# Patient Record
Sex: MALE | Race: BLACK OR AFRICAN AMERICAN | HISPANIC OR LATINO | Employment: FULL TIME | URBAN - METROPOLITAN AREA
[De-identification: names, ages, dates, MRNs, and addresses within clinical notes are randomized per-mention and may not be internally consistent; named-entity substitution may affect disease eponyms.]

---

## 2024-07-15 ENCOUNTER — OFFICE VISIT (OUTPATIENT)
Dept: INTERNAL MEDICINE | Age: 39
End: 2024-07-15
Payer: COMMERCIAL

## 2024-07-15 VITALS
HEART RATE: 100 BPM | WEIGHT: 135.2 LBS | OXYGEN SATURATION: 98 % | TEMPERATURE: 98.4 F | SYSTOLIC BLOOD PRESSURE: 136 MMHG | HEIGHT: 68 IN | DIASTOLIC BLOOD PRESSURE: 90 MMHG | BODY MASS INDEX: 20.49 KG/M2

## 2024-07-15 DIAGNOSIS — G89.29 CHRONIC MIDLINE LOW BACK PAIN WITHOUT SCIATICA: ICD-10-CM

## 2024-07-15 DIAGNOSIS — Z00.00 WELL ADULT EXAM: Primary | ICD-10-CM

## 2024-07-15 DIAGNOSIS — M54.50 CHRONIC MIDLINE LOW BACK PAIN WITHOUT SCIATICA: ICD-10-CM

## 2024-07-15 DIAGNOSIS — R03.0 ELEVATED BLOOD PRESSURE READING IN OFFICE WITH WHITE COAT SYNDROME, WITHOUT DIAGNOSIS OF HYPERTENSION: ICD-10-CM

## 2024-07-15 DIAGNOSIS — R14.0 ABDOMINAL BLOATING: ICD-10-CM

## 2024-07-15 PROCEDURE — 99386 PREV VISIT NEW AGE 40-64: CPT | Performed by: INTERNAL MEDICINE

## 2024-07-15 RX ORDER — MULTIPLE VITAMINS W/ MINERALS TAB 9MG-400MCG
1 TAB ORAL DAILY
COMMUNITY

## 2024-07-15 NOTE — ASSESSMENT & PLAN NOTE
Preventive measures: were reviewed with the patient at this office visit.  They included but were not limited to discussions in regards to vaccines outstanding, auto safety with seat belts and other assistive devices, fall prevention, and routine screening studies.    Exercise: No ischemic issues with routine activity presently, nor when running previously.  Comprehensive labs: All needed as of 7/24 OV.    Covid vaccine: Patient initial vaccines, none since then.  Other vaccines: Otherwise up-to-date.    PSA: Not yet indicated (negative FH)  Colon: Not yet indicated (negative FH)    SH: Single, no kids, non-smoker, industrial design.  FH: Father's side unknown, M mood d/o, S year older and healthy.

## 2024-07-15 NOTE — ASSESSMENT & PLAN NOTE
Initial episode was around age 34, no known trauma per se.  It persisted, he had a MRI which revealed a herniated disc at one of the lumbar regions.  It resolved with conservative treatment, he saw physical therapy etc.    As it is up recently during a move loading furniture etc. into a van.  Needed they are more days than not, but not requiring any over-the-counter medications just yet.

## 2024-07-15 NOTE — ASSESSMENT & PLAN NOTE
This is a chronic issue, past couple years as of his 7/24 OV.  Having issues keeping his weight up, it is typically in the 155 ballpark.  Oral intake is limited due to this bloating.  He does move his bowels regularly without any over-the-counter supplements.  We will get routine laboratory studies and place referral.

## 2024-07-15 NOTE — ASSESSMENT & PLAN NOTE
This was repeated and confirmed at his 7/24 OV.  Discussed with patient to monitor blood pressure as outpatient once a week of record these, and we will follow him up in about 3 months.

## 2024-07-15 NOTE — PROGRESS NOTES
"Chief Complaint  Getting Established (Pt moved her from [place]/He would like to talk about his back pain, he states that he has a herniated disc. /He would like to talk about a Colon screening, he feels that he is having some gastro issues that may be covered up due to his back pain. )    Subjective      iMah Reardon presents to [facility]    History of Present Illness  Patient is a 39-year-old male with apparent history of herniated disc, no chronic medical issues otherwise, no meds, who is being seen in 7/24 as a New Patient.  We will address his care gaps and his new concerns, follow-up routine labs and make further recommendations at that time.    Review of Systems   Constitutional:  Negative for appetite change, fatigue and fever.   HENT:  Negative for congestion and ear pain.    Eyes:  Negative for blurred vision.   Respiratory:  Negative for cough, chest tightness, shortness of breath and wheezing.    Cardiovascular:  Negative for chest pain, palpitations and leg swelling.   Gastrointestinal:  Negative for abdominal pain.   Genitourinary:  Negative for difficulty urinating, dysuria and hematuria.   Musculoskeletal:  Negative for arthralgias and gait problem.   Skin:  Negative for skin lesions.   Neurological:  Negative for syncope, memory problem and confusion.   Psychiatric/Behavioral:  Negative for self-injury and depressed mood.        Objective   Vital Signs:   /90   Pulse 100   Temp 98.4 °F (36.9 °C) (Skin)   Ht 172.7 cm (68\")   Wt 61.3 kg (135 lb 3.2 oz)   SpO2 98%   BMI 20.56 kg/m²           Physical Exam  Vitals and nursing note reviewed.   Constitutional:       General: He is not in acute distress.     Appearance: Normal appearance. He is not toxic-appearing.   HENT:      Head: Atraumatic.      Right Ear: External ear normal.      Left Ear: External ear normal.      Nose: Nose normal.      Mouth/Throat:      Mouth: Mucous membranes are " moist.   Eyes:      General:         Right eye: No discharge.         Left eye: No discharge.      Extraocular Movements: Extraocular movements intact.      Pupils: Pupils are equal, round, and reactive to light.   Neck:      Comments: No carotid bruits.  Cardiovascular:      Rate and Rhythm: Normal rate and regular rhythm.      Pulses: Normal pulses.      Heart sounds: Normal heart sounds. No murmur heard.     No gallop.      Comments: Heart tones normal, no ectopy.  Pulmonary:      Effort: Pulmonary effort is normal. No respiratory distress.      Breath sounds: No wheezing, rhonchi or rales.      Comments: Lung fields clear bilaterally.  Abdominal:      General: There is no distension.      Palpations: Abdomen is soft. There is no mass.      Tenderness: There is no abdominal tenderness. There is no guarding.   Musculoskeletal:         General: No swelling or tenderness.      Cervical back: No tenderness.      Right lower leg: No edema.      Left lower leg: No edema.      Comments: No peripheral edema.   Skin:     General: Skin is warm and dry.      Findings: No rash.   Neurological:      General: No focal deficit present.      Mental Status: He is alert and oriented to person, place, and time. Mental status is at baseline.      Motor: No weakness.      Gait: Gait normal.   Psychiatric:         Mood and Affect: Mood normal.         Thought Content: Thought content normal.          Result Review   The following data was reviewed by: Franco Decker MD on           Assessment and Plan   Diagnoses and all orders for this visit:    1. Well adult exam (Primary)  Assessment & Plan:  Preventive measures: were reviewed with the patient at this office visit.  They included but were not limited to discussions in regards to vaccines outstanding, auto safety with seat belts and other assistive devices, fall prevention, and routine screening studies.    Exercise: No ischemic issues with routine activity presently, nor when  running previously.  Comprehensive labs: All needed as of 7/24 OV.    Covid vaccine: Patient initial vaccines, none since then.  Other vaccines: Otherwise up-to-date.    PSA: Not yet indicated (negative FH)  Colon: Not yet indicated (negative FH)    SH: Single, no kids, non-smoker, industrial design.  FH: Father's side unknown, M mood d/o, S year older and healthy.    Orders:  -     Hepatitis C Antibody; Future  -     CBC & Differential; Future  -     Comprehensive Metabolic Panel; Future  -     Lipid Panel; Future  -     Testosterone; Future  -     TSH+Free T4; Future  -     Urinalysis With Culture If Indicated -; Future  -     Vitamin D,25-Hydroxy; Future  -     Hemoglobin A1c; Future  -     Sedimentation Rate; Future  -     C-reactive protein; Future    2. Abdominal bloating  Assessment & Plan:  This is a chronic issue, past couple years as of his 7/24 OV.  Having issues keeping his weight up, it is typically in the 155 ballpark.  Oral intake is limited due to this bloating.  He does move his bowels regularly without any over-the-counter supplements.  We will get routine laboratory studies and place referral.    Orders:  -     Cancel: Ambulatory Referral to Gastroenterology  -     Ambulatory Referral to Gastroenterology    3. Elevated blood pressure reading in office with white coat syndrome, without diagnosis of hypertension  Assessment & Plan:  This was repeated and confirmed at his 7/24 OV.  Discussed with patient to monitor blood pressure as outpatient once a week of record these, and we will follow him up in about 3 months.      4. Chronic midline low back pain without sciatica  Assessment & Plan:  Initial episode was around age 34, no known trauma per se.  It persisted, he had a MRI which revealed a herniated disc at one of the lumbar regions.  It resolved with conservative treatment, he saw physical therapy etc.    As it is up recently during a move loading furniture etc. into a van.  Needed they are more  days than not, but not requiring any over-the-counter medications just yet.    Orders:  -     XR Spine Lumbar 2 or 3 View; Future  -     Ambulatory Referral to Gastroenterology  -     Ambulatory Referral to Physical Therapy for Evaluation & Treatment        BMI is within normal parameters. No other follow-up for BMI required.            Follow Up   Return in about 3 months (around 10/15/2024).  Patient was given instructions and counseling regarding his condition or for health maintenance advice. Please see specific information pulled into the AVS if appropriate.     Total Time Spent:   minutes     This time includes time spent by me in the following activities: preparing for the visit, reviewing extensive past medical history and tests, performing a medically appropriate examination and/or evaluation, counseling and educating the patient and/or caregivers, ordering medications, tests, or procedures, referring and/or communicating with other health care professionals and documenting information in the medical record all on this date of service.

## 2024-07-23 ENCOUNTER — LAB (OUTPATIENT)
Dept: LAB | Facility: HOSPITAL | Age: 39
End: 2024-07-23
Payer: COMMERCIAL

## 2024-07-23 ENCOUNTER — HOSPITAL ENCOUNTER (OUTPATIENT)
Dept: GENERAL RADIOLOGY | Facility: HOSPITAL | Age: 39
Discharge: HOME | End: 2024-07-23
Payer: COMMERCIAL

## 2024-07-23 DIAGNOSIS — G89.29 CHRONIC MIDLINE LOW BACK PAIN WITHOUT SCIATICA: ICD-10-CM

## 2024-07-23 DIAGNOSIS — M54.50 CHRONIC MIDLINE LOW BACK PAIN WITHOUT SCIATICA: ICD-10-CM

## 2024-07-23 DIAGNOSIS — Z00.00 WELL ADULT EXAM: ICD-10-CM

## 2024-07-23 LAB
25(OH)D3 SERPL-MCNC: 26.8 NG/ML (ref 30–100)
ALBUMIN SERPL-MCNC: 4.7 G/DL (ref 3.5–5.2)
ALBUMIN/GLOB SERPL: 1.8 G/DL
ALP SERPL-CCNC: 37 U/L (ref 39–117)
ALT SERPL W P-5'-P-CCNC: 14 U/L (ref 1–41)
ANION GAP SERPL CALCULATED.3IONS-SCNC: 10.4 MMOL/L (ref 5–15)
AST SERPL-CCNC: 18 U/L (ref 1–40)
BACTERIA UR QL AUTO: ABNORMAL /HPF
BASOPHILS # BLD AUTO: 0.05 10*3/MM3 (ref 0–0.2)
BASOPHILS NFR BLD AUTO: 0.8 % (ref 0–1.5)
BILIRUB SERPL-MCNC: 0.8 MG/DL (ref 0–1.2)
BILIRUB UR QL STRIP: NEGATIVE
BUN SERPL-MCNC: 11 MG/DL (ref 6–20)
BUN/CREAT SERPL: 11.3 (ref 7–25)
CALCIUM SPEC-SCNC: 9.6 MG/DL (ref 8.6–10.5)
CHLORIDE SERPL-SCNC: 100 MMOL/L (ref 98–107)
CHOLEST SERPL-MCNC: 164 MG/DL (ref 0–200)
CLARITY UR: CLEAR
CO2 SERPL-SCNC: 26.6 MMOL/L (ref 22–29)
COLOR UR: YELLOW
CREAT SERPL-MCNC: 0.97 MG/DL (ref 0.76–1.27)
CRP SERPL-MCNC: <0.3 MG/DL (ref 0–0.5)
DEPRECATED RDW RBC AUTO: 39.2 FL (ref 37–54)
EGFRCR SERPLBLD CKD-EPI 2021: 101.2 ML/MIN/1.73
EOSINOPHIL # BLD AUTO: 0.12 10*3/MM3 (ref 0–0.4)
EOSINOPHIL NFR BLD AUTO: 2 % (ref 0.3–6.2)
ERYTHROCYTE [DISTWIDTH] IN BLOOD BY AUTOMATED COUNT: 12.6 % (ref 12.3–15.4)
ERYTHROCYTE [SEDIMENTATION RATE] IN BLOOD: <1 MM/HR (ref 0–15)
GLOBULIN UR ELPH-MCNC: 2.6 GM/DL
GLUCOSE SERPL-MCNC: 89 MG/DL (ref 65–99)
GLUCOSE UR STRIP-MCNC: NEGATIVE MG/DL
HBA1C MFR BLD: 5.3 % (ref 4.8–5.6)
HCT VFR BLD AUTO: 43.1 % (ref 37.5–51)
HCV AB SER QL: NORMAL
HDLC SERPL-MCNC: 54 MG/DL (ref 40–60)
HGB BLD-MCNC: 14.6 G/DL (ref 13–17.7)
HGB UR QL STRIP.AUTO: ABNORMAL
HOLD SPECIMEN: NORMAL
HYALINE CASTS UR QL AUTO: ABNORMAL /LPF
IMM GRANULOCYTES # BLD AUTO: 0.01 10*3/MM3 (ref 0–0.05)
IMM GRANULOCYTES NFR BLD AUTO: 0.2 % (ref 0–0.5)
KETONES UR QL STRIP: NEGATIVE
LDLC SERPL CALC-MCNC: 95 MG/DL (ref 0–100)
LDLC/HDLC SERPL: 1.75 {RATIO}
LEUKOCYTE ESTERASE UR QL STRIP.AUTO: NEGATIVE
LYMPHOCYTES # BLD AUTO: 1.41 10*3/MM3 (ref 0.7–3.1)
LYMPHOCYTES NFR BLD AUTO: 23.5 % (ref 19.6–45.3)
MCH RBC QN AUTO: 29.3 PG (ref 26.6–33)
MCHC RBC AUTO-ENTMCNC: 33.9 G/DL (ref 31.5–35.7)
MCV RBC AUTO: 86.5 FL (ref 79–97)
MONOCYTES # BLD AUTO: 0.62 10*3/MM3 (ref 0.1–0.9)
MONOCYTES NFR BLD AUTO: 10.3 % (ref 5–12)
NEUTROPHILS NFR BLD AUTO: 3.79 10*3/MM3 (ref 1.7–7)
NEUTROPHILS NFR BLD AUTO: 63.2 % (ref 42.7–76)
NITRITE UR QL STRIP: NEGATIVE
NRBC BLD AUTO-RTO: 0 /100 WBC (ref 0–0.2)
PH UR STRIP.AUTO: 7 [PH] (ref 5–8)
PLATELET # BLD AUTO: 209 10*3/MM3 (ref 140–450)
PMV BLD AUTO: 11.9 FL (ref 6–12)
POTASSIUM SERPL-SCNC: 3.7 MMOL/L (ref 3.5–5.2)
PROT SERPL-MCNC: 7.3 G/DL (ref 6–8.5)
PROT UR QL STRIP: ABNORMAL
RBC # BLD AUTO: 4.98 10*6/MM3 (ref 4.14–5.8)
RBC # UR STRIP: ABNORMAL /HPF
REF LAB TEST METHOD: ABNORMAL
SODIUM SERPL-SCNC: 137 MMOL/L (ref 136–145)
SP GR UR STRIP: 1.01 (ref 1–1.03)
SQUAMOUS #/AREA URNS HPF: ABNORMAL /HPF
T4 FREE SERPL-MCNC: 1.34 NG/DL (ref 0.92–1.68)
TESTOST SERPL-MCNC: 736 NG/DL (ref 249–836)
TRIGL SERPL-MCNC: 77 MG/DL (ref 0–150)
TSH SERPL DL<=0.05 MIU/L-ACNC: 1.12 UIU/ML (ref 0.27–4.2)
UROBILINOGEN UR QL STRIP: ABNORMAL
VLDLC SERPL-MCNC: 15 MG/DL (ref 5–40)
WBC # UR STRIP: ABNORMAL /HPF
WBC NRBC COR # BLD AUTO: 6 10*3/MM3 (ref 3.4–10.8)

## 2024-07-23 PROCEDURE — 84439 ASSAY OF FREE THYROXINE: CPT

## 2024-07-23 PROCEDURE — 85025 COMPLETE CBC W/AUTO DIFF WBC: CPT

## 2024-07-23 PROCEDURE — 83036 HEMOGLOBIN GLYCOSYLATED A1C: CPT

## 2024-07-23 PROCEDURE — 72100 X-RAY EXAM L-S SPINE 2/3 VWS: CPT

## 2024-07-23 PROCEDURE — 82306 VITAMIN D 25 HYDROXY: CPT

## 2024-07-23 PROCEDURE — 84403 ASSAY OF TOTAL TESTOSTERONE: CPT

## 2024-07-23 PROCEDURE — 85652 RBC SED RATE AUTOMATED: CPT

## 2024-07-23 PROCEDURE — 36415 COLL VENOUS BLD VENIPUNCTURE: CPT

## 2024-07-23 PROCEDURE — 80061 LIPID PANEL: CPT

## 2024-07-23 PROCEDURE — 86140 C-REACTIVE PROTEIN: CPT

## 2024-07-23 PROCEDURE — 81001 URINALYSIS AUTO W/SCOPE: CPT

## 2024-07-23 PROCEDURE — 86803 HEPATITIS C AB TEST: CPT

## 2024-07-23 PROCEDURE — 80053 COMPREHEN METABOLIC PANEL: CPT

## 2024-07-23 PROCEDURE — 84443 ASSAY THYROID STIM HORMONE: CPT

## 2024-07-25 ENCOUNTER — TELEPHONE (OUTPATIENT)
Dept: INTERNAL MEDICINE | Age: 39
End: 2024-07-25
Payer: COMMERCIAL

## 2024-07-25 DIAGNOSIS — R31.9 HEMATURIA, UNSPECIFIED TYPE: Primary | ICD-10-CM

## 2024-07-25 NOTE — TELEPHONE ENCOUNTER
----- Message from Franco Decker sent at 7/25/2024 12:25 AM EDT -----  Please place order for repeat UA to eval hematuria. Ask him to do it in about 2 weeks and call for results.

## 2024-07-31 ENCOUNTER — PATIENT ROUNDING (BHMG ONLY) (OUTPATIENT)
Dept: INTERNAL MEDICINE | Age: 39
End: 2024-07-31
Payer: COMMERCIAL

## 2024-08-08 ENCOUNTER — LAB (OUTPATIENT)
Dept: LAB | Facility: HOSPITAL | Age: 39
End: 2024-08-08
Payer: COMMERCIAL

## 2024-08-08 DIAGNOSIS — R31.9 HEMATURIA, UNSPECIFIED TYPE: ICD-10-CM

## 2024-08-08 LAB
BACTERIA UR QL AUTO: ABNORMAL /HPF
BILIRUB UR QL STRIP: NEGATIVE
CLARITY UR: CLEAR
COLOR UR: YELLOW
GLUCOSE UR STRIP-MCNC: NEGATIVE MG/DL
HGB UR QL STRIP.AUTO: ABNORMAL
HYALINE CASTS UR QL AUTO: ABNORMAL /LPF
KETONES UR QL STRIP: NEGATIVE
LEUKOCYTE ESTERASE UR QL STRIP.AUTO: NEGATIVE
NITRITE UR QL STRIP: NEGATIVE
PH UR STRIP.AUTO: 7.5 [PH] (ref 5–8)
PROT UR QL STRIP: ABNORMAL
RBC # UR STRIP: ABNORMAL /HPF
REF LAB TEST METHOD: ABNORMAL
SP GR UR STRIP: 1.01 (ref 1–1.03)
SQUAMOUS #/AREA URNS HPF: ABNORMAL /HPF
UROBILINOGEN UR QL STRIP: ABNORMAL
WBC # UR STRIP: ABNORMAL /HPF

## 2024-08-08 PROCEDURE — 81001 URINALYSIS AUTO W/SCOPE: CPT

## 2024-09-17 ENCOUNTER — OFFICE VISIT (OUTPATIENT)
Dept: GASTROENTEROLOGY | Facility: CLINIC | Age: 39
End: 2024-09-17
Payer: COMMERCIAL

## 2024-09-17 VITALS
WEIGHT: 134.4 LBS | DIASTOLIC BLOOD PRESSURE: 84 MMHG | BODY MASS INDEX: 20.44 KG/M2 | HEART RATE: 95 BPM | SYSTOLIC BLOOD PRESSURE: 128 MMHG | OXYGEN SATURATION: 98 %

## 2024-09-17 DIAGNOSIS — K62.89 PERIANAL PAIN: Primary | ICD-10-CM

## 2024-09-17 DIAGNOSIS — R63.4 WEIGHT LOSS, ABNORMAL: ICD-10-CM

## 2024-09-17 PROCEDURE — 99203 OFFICE O/P NEW LOW 30 MIN: CPT

## 2024-10-11 ENCOUNTER — TELEPHONE (OUTPATIENT)
Dept: GASTROENTEROLOGY | Facility: CLINIC | Age: 39
End: 2024-10-11
Payer: COMMERCIAL

## 2024-10-11 NOTE — TELEPHONE ENCOUNTER
Caller: Miah Reardon    Relationship to patient: Self    Best call back number:  [phone]         Type of visit: EGD/COLON    Requested date: NOT RESCHEDULING     If rescheduling, when is the original appointment: 10/22/2024     Additional notes:PT WOULD LIKE TO CANCEL HIS EGD/COLONOSCOPY  DUE TO INSURANCE.

## 2024-10-16 ENCOUNTER — OFFICE VISIT (OUTPATIENT)
Dept: INTERNAL MEDICINE | Age: 39
End: 2024-10-16
Payer: COMMERCIAL

## 2024-10-16 VITALS
WEIGHT: 132 LBS | DIASTOLIC BLOOD PRESSURE: 84 MMHG | OXYGEN SATURATION: 99 % | SYSTOLIC BLOOD PRESSURE: 124 MMHG | HEART RATE: 99 BPM | BODY MASS INDEX: 20 KG/M2 | TEMPERATURE: 98 F | HEIGHT: 68 IN

## 2024-10-16 DIAGNOSIS — R31.21 ASYMPTOMATIC MICROSCOPIC HEMATURIA: ICD-10-CM

## 2024-10-16 DIAGNOSIS — R03.0 ELEVATED BLOOD PRESSURE READING IN OFFICE WITH WHITE COAT SYNDROME, WITHOUT DIAGNOSIS OF HYPERTENSION: Primary | ICD-10-CM

## 2024-10-16 DIAGNOSIS — Z23 NEED FOR INFLUENZA VACCINATION: ICD-10-CM

## 2024-10-16 DIAGNOSIS — R14.0 ABDOMINAL BLOATING: ICD-10-CM

## 2024-10-16 DIAGNOSIS — G89.29 CHRONIC MIDLINE LOW BACK PAIN WITHOUT SCIATICA: ICD-10-CM

## 2024-10-16 DIAGNOSIS — J32.9 CHRONIC RHINOSINUSITIS: ICD-10-CM

## 2024-10-16 DIAGNOSIS — M54.50 CHRONIC MIDLINE LOW BACK PAIN WITHOUT SCIATICA: ICD-10-CM

## 2024-10-16 DIAGNOSIS — R63.4 WEIGHT LOSS, ABNORMAL: ICD-10-CM

## 2024-10-16 DIAGNOSIS — Z23 NEED FOR VACCINATION: ICD-10-CM

## 2024-10-16 DIAGNOSIS — H90.42 SENSORINEURAL HEARING LOSS (SNHL) OF LEFT EAR WITH UNRESTRICTED HEARING OF RIGHT EAR: ICD-10-CM

## 2024-10-16 DIAGNOSIS — E55.9 VITAMIN D DEFICIENCY: ICD-10-CM

## 2024-10-16 PROBLEM — J30.1 SEASONAL ALLERGIC RHINITIS DUE TO POLLEN: Status: ACTIVE | Noted: 2024-10-16

## 2024-10-16 PROCEDURE — 90471 IMMUNIZATION ADMIN: CPT | Performed by: INTERNAL MEDICINE

## 2024-10-16 PROCEDURE — 99214 OFFICE O/P EST MOD 30 MIN: CPT | Performed by: INTERNAL MEDICINE

## 2024-10-16 PROCEDURE — 90656 IIV3 VACC NO PRSV 0.5 ML IM: CPT | Performed by: INTERNAL MEDICINE

## 2024-10-16 NOTE — ASSESSMENT & PLAN NOTE
Vitamin D came back low at 27, we reviewed this at his 10/24 OV, discussed with patient 2000 IUs daily would be appropriate.

## 2024-10-16 NOTE — ASSESSMENT & PLAN NOTE
Patient with long standing history of this, no significant benefit from Astelin/Claritin/etc., or at least benefit outweighed by side effects.  It is somewhat seasonal, also related to changes in pressure etc.  Patient has not had formal evaluation by allergist, this would be the next best step.

## 2024-10-16 NOTE — ASSESSMENT & PLAN NOTE
This has slowed at least as of his 10/24 OV, his weight is 132.  He still has some abdominal bloating and other issues, he is scheduled for formal evaluation by GI just next week.  Will defer to their expertise.

## 2024-10-16 NOTE — ASSESSMENT & PLAN NOTE
This is minor, but persistent.  Patient does not have any history of kidney stones etc., he is not having any symptoms of UTI etc., his urine does not support a UTI diagnosis either.  His renal function is well normal.    Will place nonurgent referral to urology to see what they recommend.

## 2024-10-16 NOTE — ASSESSMENT & PLAN NOTE
Patient tried to get scheduled in [place], did not hear back from [facility] PT, will refer to PTA.

## 2024-10-16 NOTE — PROGRESS NOTES
Chief Complaint  Follow-up (Pt had labs in July 2024. ) and Nasal Congestion (Pt states that since his hearing loss, he feels a tingling feeling behind his ear and he feels that he has congestion all the time on the left side. )    Subjective      Miah Reardon presents to [facility]    Back Pain  This is a chronic problem. The current episode started more than 1 year ago. The problem occurs weekly. The problem has been coming and going since onset. The pain is present in the lumbar spine. The quality of the pain is described as aching and shooting. The pain radiates to the right buttock. The pain is at a severity of 2/10. The pain is Worse during the day. The symptoms are aggravated by position and sitting. Associated symptoms include paresthesias and weight loss. Pertinent negatives include no abdominal pain, bladder incontinence, bowel incontinence, chest pain, dysuria, fever, perianal numbness or weakness. Risk factors include lack of exercise and sedentary lifestyle.   Additional comments: The numbers for pain are more of an overall discomfort. Some slight pressure from prolonged sitting at work. The tightness is I believe the musles trying to protect it and me holding good posture.    History of present illness:  Patient is a 39-year-old male with apparent history of herniated disc, no chronic medical issues otherwise, no meds, who was seen in 7/24 as a New Patient, and who is coming in now 10/24 for initial laboratory follow-up.  We will address his care gaps and his new concerns, follow-up routine labs and make further recommendations at that time.    Review of Systems   Constitutional:  Positive for unexpected weight loss. Negative for appetite change, fatigue and fever.   HENT:  Negative for congestion and ear pain.    Eyes:  Negative for blurred vision.   Respiratory:  Negative for cough, chest tightness, shortness of breath and wheezing.    Cardiovascular:   "Negative for chest pain, palpitations and leg swelling.   Gastrointestinal:  Negative for abdominal pain and bowel incontinence.   Genitourinary:  Negative for difficulty urinating, dysuria, hematuria and urinary incontinence.   Musculoskeletal:  Positive for back pain. Negative for arthralgias and gait problem.   Skin:  Negative for skin lesions.   Neurological:  Positive for paresthesias. Negative for syncope, weakness, memory problem and confusion.   Psychiatric/Behavioral:  Negative for self-injury and depressed mood.        Objective   Vital Signs:   /84   Pulse 99   Temp 98 °F (36.7 °C) (Skin)   Ht 172.7 cm (67.99\")   Wt 59.9 kg (132 lb)   SpO2 99%   BMI 20.08 kg/m²           Physical Exam  Vitals and nursing note reviewed.   Constitutional:       General: He is not in acute distress.     Appearance: Normal appearance. He is not toxic-appearing.   HENT:      Head: Atraumatic.      Right Ear: External ear normal.      Left Ear: External ear normal.      Nose: Nose normal.      Mouth/Throat:      Mouth: Mucous membranes are moist.   Eyes:      General:         Right eye: No discharge.         Left eye: No discharge.      Extraocular Movements: Extraocular movements intact.      Pupils: Pupils are equal, round, and reactive to light.   Neck:      Comments: No carotid bruits.  Cardiovascular:      Rate and Rhythm: Normal rate and regular rhythm.      Pulses: Normal pulses.      Heart sounds: Normal heart sounds. No murmur heard.     No gallop.      Comments: Heart tones normal, no ectopy.  Pulmonary:      Effort: Pulmonary effort is normal. No respiratory distress.      Breath sounds: No wheezing, rhonchi or rales.      Comments: Lung fields clear bilaterally.  Abdominal:      General: There is no distension.      Palpations: Abdomen is soft. There is no mass.      Tenderness: There is no abdominal tenderness. There is no guarding.   Musculoskeletal:         General: No swelling or tenderness.      " Cervical back: No tenderness.      Right lower leg: No edema.      Left lower leg: No edema.      Comments: No peripheral edema.   Skin:     General: Skin is warm and dry.      Findings: No rash.   Neurological:      General: No focal deficit present.      Mental Status: He is alert and oriented to person, place, and time. Mental status is at baseline.      Motor: No weakness.      Gait: Gait normal.   Psychiatric:         Mood and Affect: Mood normal.         Thought Content: Thought content normal.          Result Review   The following data was reviewed by: Franco Decker MD on           Assessment and Plan   Diagnoses and all orders for this visit:    1. Elevated blood pressure reading in office with white coat syndrome, without diagnosis of hypertension (Primary)  Overview:  Negative family history for this in his immediate family at least.    Assessment & Plan:  Patient has not been following his blood pressure as requested, but his blood pressure is improved as of his 10/24 OV.  His diastolic blood pressure is still a little borderline, but certainly no treatment indicated at this time, he is trying to watch the salt in his diet, will follow-up blood pressure on return to office.      2. Vitamin D deficiency  Assessment & Plan:  Vitamin D came back low at 27, we reviewed this at his 10/24 OV, discussed with patient 2000 IUs daily would be appropriate.    Orders:  -     Vitamin D,25-Hydroxy; Future    3. Asymptomatic microscopic hematuria  Assessment & Plan:  This is minor, but persistent.  Patient does not have any history of kidney stones etc., he is not having any symptoms of UTI etc., his urine does not support a UTI diagnosis either.  His renal function is well normal.    Will place nonurgent referral to urology to see what they recommend.    Orders:  -     Ambulatory Referral to Urology  -     Basic Metabolic Panel; Future    4. Abdominal bloating  Assessment & Plan:  These issues are persisting, patient  is scheduled for EGD/colonoscopy next week by Dr. Hardwick.      5. Chronic rhinosinusitis  Assessment & Plan:  Patient with long standing history of this, no significant benefit from Astelin/Claritin/etc., or at least benefit outweighed by side effects.  It is somewhat seasonal, also related to changes in pressure etc.  Patient has not had formal evaluation by allergist, this would be the next best step.      6. Chronic midline low back pain without sciatica  Overview:  L-spine 7/24:  There is normal alignment. No acute fracture or subluxation vertebral body heights and disc space heights are preserved. No significant degenerative change identified. 6 mm sclerotic lesion in the L4 posterior spinous process is indeterminant, but may be due to a bone island. Probable phleboliths in the pelvis.    Assessment & Plan:  Patient tried to get scheduled in [place], did not hear back from [facility] PT, will refer to PTA.    Orders:  -     Ambulatory Referral to Physical Therapy for Evaluation & Treatment    7. Need for influenza vaccination  -     Fluzone >6mos ([id number])    8. Need for vaccination    9. Sensorineural hearing loss (SNHL) of left ear with unrestricted hearing of right ear  Assessment & Plan:  Patient was seen by ENT for this previously, it came on suddenly, sounds like he had an MRI for rule out CPA tumor etc.  Apparently they tried an injections, without benefit.  He does have chronic tinnitus as well.  He says he regained to maybe 30% hearing.      10. Weight loss, abnormal  Assessment & Plan:  This has slowed at least as of his 10/24 OV, his weight is 132.  He still has some abdominal bloating and other issues, he is scheduled for formal evaluation by GI just next week.  Will defer to their expertise.        BMI is within normal parameters. No other follow-up for BMI required.    Total Time Spent:  32 minutes     This time includes time spent by me in the following activities: preparing for the visit, reviewing  extensive past medical history and tests, performing a medically appropriate examination and/or evaluation, counseling and educating the patient and/or caregivers, ordering medications, tests, or procedures, referring and/or communicating with other health care professionals and documenting information in the medical record all on this date of service.     Follow Up   Return in about 6 months (around 4/16/2025).  Patient was given instructions and counseling regarding his condition or for health maintenance advice. Please see specific information pulled into the AVS if appropriate.

## 2024-10-16 NOTE — ASSESSMENT & PLAN NOTE
Patient has not been following his blood pressure as requested, but his blood pressure is improved as of his 10/24 OV.  His diastolic blood pressure is still a little borderline, but certainly no treatment indicated at this time, he is trying to watch the salt in his diet, will follow-up blood pressure on return to office.

## 2024-10-16 NOTE — ASSESSMENT & PLAN NOTE
Patient was seen by ENT for this previously, it came on suddenly, sounds like he had an MRI for rule out CPA tumor etc.  Apparently they tried an injections, without benefit.  He does have chronic tinnitus as well.  He says he regained to maybe 30% hearing.

## 2024-12-17 NOTE — PROGRESS NOTES
"Chief Complaint: Blood in Urine    Subjective         History of Present Illness  Miah Reardon is a 39 y.o. male presents to [facility] to be seen for hematuria.    He was found to have blood on urine microscopy X 2.  He reports that he was asymptomatic at the time of those labs.      Frequency-admits, but does drink a lot of fluids      Urgency-denies     Incontinence-admits, occasional post void dribble     Nocturia-admits, X 1 per night, but eats and drinks late because he works second shift     Perineal pain-denies     Dysuria-denies     Stream-normal     GH-denies     History of stones-denies      surgeries-denies     Family history of  malignancy-denies     Cardiopulmonary-HTN (\"white coat syndrome\")     Anticoagulants-none     Smoker-denies     UA with microscopy  2024 RBCs 6-10 per high-powered field  2024 RBC 3-5 per high-powered field    Objective     Past Medical History:   Diagnosis Date    HL (hearing loss) 2023    Diagnosed as SSHL       Past Surgical History:   Procedure Laterality Date    HAND SURGERY Left     Tenden repair from laceration         Current Outpatient Medications:     Denta 5000 Plus 1.1 % cream, See Admin Instructions., Disp: , Rfl:     fluticasone (FLONASE) 50 MCG/ACT nasal spray, Administer 2 sprays into the nostril(s) as directed by provider Daily., Disp: , Rfl:     multivitamin with minerals tablet tablet, Take 1 tablet by mouth Daily., Disp: , Rfl:     No Known Allergies     Family History   Problem Relation Age of Onset    Depression Mother     Cancer Maternal Grandfather     Colon cancer Neg Hx        Social History     Socioeconomic History    Marital status: Single   Tobacco Use    Smoking status: Former     Current packs/day: 0.00     Average packs/day: 0.3 packs/day for 10.1 years (2.5 ttl pk-yrs)     Types: Cigarettes     Start date: 2001     Quit date: 2012     Years since quittin.9     Passive exposure: Past " "   Smokeless tobacco: Never    Tobacco comments:     Was a regular smoker on and off. Most consistent in early twentys then again late 20's early 30's.   Vaping Use    Vaping status: Never Used   Substance and Sexual Activity    Alcohol use: Yes     Alcohol/week: 2.0 standard drinks of alcohol     Types: 2 Cans of beer per week     Comment: Recently have had glass of wine on weekends.    Drug use: Never    Sexual activity: Not Currently     Partners: Female     Birth control/protection: Condom, I.U.D.       Vital Signs:   Resp 14   Ht 172.7 cm (67.99\")   Wt 59.9 kg (132 lb 0.9 oz)   BMI 20.08 kg/m²      Physical Exam  Vitals reviewed.   Constitutional:       Appearance: Normal appearance.   Neurological:      General: No focal deficit present.      Mental Status: He is alert and oriented to person, place, and time.   Psychiatric:         Mood and Affect: Mood normal.         Behavior: Behavior normal.          Result Review :   The following data was reviewed by: SHAISTA Taylor on 12/19/2024:       Bladder Scan interpretation 12/19/2024    Estimation of residual urine via LetaoI 3000 Verathon Bladder Scan  MA/nurse performing: Norma MILLIGAN MA  Residual Urine: 0 ml  Indication: Asymptomatic microscopic hematuria   Position: Supine  Examination: Incremental scanning of the suprapubic area using 2.0 MHz transducer using copious amounts of acoustic gel.   Findings: An anechoic area was demonstrated which represented the bladder, with measurement of residual urine as noted. I inspected this myself. In that the residual urine was stable or insignificant, refer to plan for treatment and plan necessary at this time.         Procedures        Assessment and Plan    Diagnoses and all orders for this visit:    1. Asymptomatic microscopic hematuria (Primary)  -     Bladder Scan  -     CT Abdomen Pelvis With & Without Contrast; Future  -     Cystoscopy; Future    Will proceed with asymptomatic microhematuria workup " per AUA guidelines including upper and lower urinary tract evaluations.  Obtain CT scan with/without/and delayed imaging for upper tract evaluation; patient denies contrast allergy  Patient to schedule cystoscopy for lower urinary tract evaluation after CT scan.      All questions addressed.     Will follow-up with the patient following his CT and cystoscopy upon recommendations of the urologist.      Follow Up   Return for Cystoscopy, first available. .  Patient was given instructions and counseling regarding his condition or for health maintenance advice. Please see specific information pulled into the AVS if appropriate.         This document has been electronically signed by SHAISTA Taylor  December 19, 2024 14:21 EST

## 2024-12-19 ENCOUNTER — OFFICE VISIT (OUTPATIENT)
Dept: UROLOGY | Age: 39
End: 2024-12-19
Payer: COMMERCIAL

## 2024-12-19 VITALS — WEIGHT: 132.06 LBS | BODY MASS INDEX: 20.01 KG/M2 | RESPIRATION RATE: 14 BRPM | HEIGHT: 68 IN

## 2024-12-19 DIAGNOSIS — R31.21 ASYMPTOMATIC MICROSCOPIC HEMATURIA: Primary | ICD-10-CM

## 2024-12-19 LAB — URINE VOLUME: 0

## 2024-12-19 RX ORDER — FLUTICASONE PROPIONATE 50 MCG
2 SPRAY, SUSPENSION (ML) NASAL DAILY
COMMUNITY

## 2024-12-19 RX ORDER — SODIUM FLUORIDE 1.1 G/100G
CREAM ORAL SEE ADMIN INSTRUCTIONS
COMMUNITY
Start: 2024-12-02

## 2025-01-22 ENCOUNTER — HOSPITAL ENCOUNTER (OUTPATIENT)
Dept: CT IMAGING | Facility: HOSPITAL | Age: 40
Discharge: HOME | End: 2025-01-22
Admitting: NURSE PRACTITIONER
Payer: COMMERCIAL

## 2025-01-22 DIAGNOSIS — R31.21 ASYMPTOMATIC MICROSCOPIC HEMATURIA: ICD-10-CM

## 2025-01-22 PROCEDURE — 74178 CT ABD&PLV WO CNTR FLWD CNTR: CPT

## 2025-01-22 PROCEDURE — 25510000001 IOPAMIDOL PER 1 ML: Performed by: NURSE PRACTITIONER

## 2025-01-22 RX ORDER — IOPAMIDOL 755 MG/ML
100 INJECTION, SOLUTION INTRAVASCULAR
Status: COMPLETED | OUTPATIENT
Start: 2025-01-22 | End: 2025-01-22

## 2025-01-22 RX ADMIN — IOPAMIDOL 100 ML: 755 INJECTION, SOLUTION INTRAVENOUS at 10:33

## 2025-02-26 ENCOUNTER — TELEPHONE (OUTPATIENT)
Dept: UROLOGY | Age: 40
End: 2025-02-26
Payer: COMMERCIAL

## 2025-02-27 ENCOUNTER — TELEPHONE (OUTPATIENT)
Age: 40
End: 2025-02-27

## 2025-02-27 NOTE — TELEPHONE ENCOUNTER
Caller: Miah Reardon    Relationship: Self    Best call back number: [phone]     What form or medical record are you requesting: SHOT RECORDS    Who is requesting this form or medical record from you: EMPLOYER    How would you like to receive the form or medical records (pick-up, mail, fax): UPLOAD INTO [employer]    Timeframe paperwork needed:ASAP    Additional notes: PATIENT EMPLOYER IS ASKING FOR SHOT RECORDS PLEASE UPLOAD INTO [employer]

## 2025-03-04 ENCOUNTER — TELEPHONE (OUTPATIENT)
Age: 40
End: 2025-03-04
Payer: COMMERCIAL

## 2025-03-10 NOTE — TELEPHONE ENCOUNTER
3RD CALL - CALLED PT TO OFFER R/S OF CX'D CYSTO 2/17 W/ JOSE    NO ANSWER, LMOM    THIS IS MARY'S PT    THREE ATTEMPTS MADE TO OFFER R/S, ANYTHING ELSE TO DO?

## 2025-03-24 ENCOUNTER — OFFICE VISIT (OUTPATIENT)
Age: 40
End: 2025-03-24
Payer: COMMERCIAL

## 2025-03-24 VITALS
BODY MASS INDEX: 20.97 KG/M2 | WEIGHT: 133.6 LBS | HEIGHT: 67 IN | SYSTOLIC BLOOD PRESSURE: 132 MMHG | HEART RATE: 107 BPM | OXYGEN SATURATION: 97 % | DIASTOLIC BLOOD PRESSURE: 97 MMHG

## 2025-03-24 DIAGNOSIS — I10 ESSENTIAL HYPERTENSION: Primary | ICD-10-CM

## 2025-03-24 DIAGNOSIS — R31.21 ASYMPTOMATIC MICROSCOPIC HEMATURIA: ICD-10-CM

## 2025-03-24 PROCEDURE — 99214 OFFICE O/P EST MOD 30 MIN: CPT | Performed by: INTERNAL MEDICINE

## 2025-03-24 RX ORDER — METOPROLOL SUCCINATE 25 MG/1
25 TABLET, EXTENDED RELEASE ORAL DAILY
Qty: 90 TABLET | Refills: 1 | Status: SHIPPED | OUTPATIENT
Start: 2025-03-24

## 2025-03-24 NOTE — ASSESSMENT & PLAN NOTE
Patient being seen a little early for this since he had elevated readings at other providers offices.  He is being evaluated by orthodontist, needs some oral surgery it sounds like, and blood pressure is remaining elevated.    He is up again here in the office, particularly diastolic, he is generally on the tachycardic side of things, pulse typically around 100, so we will go ahead and use low-dose beta-blocker and titrate as indicated.

## 2025-03-24 NOTE — PATIENT INSTRUCTIONS
1.  We going to start you on a very low-dose of a medication called metoprolol.  You take this once a day.  You are starting on 25 mg, the medication goes up to some 400 mg if needed.    2.  Need you to monitor your blood pressure and pulse at least twice a week.  It takes about 3 weeks to see the main benefit from the medication, so call us in 3 weeks and let me know what your blood pressure and pulse are averaging at that time.    3.  When you call we will let you know if we need to titrate the dose, otherwise we will see you back in the office in about 3 months.

## 2025-03-24 NOTE — PROGRESS NOTES
"Chief Complaint  Hypertension    Subjective      Miah Reardon presents to [facility]    History of present illness:  Patient is a 40-year-old male with apparent history of herniated disc, no chronic medical issues otherwise, no meds, who was seen in 7/24 as a New Patient, and who is coming in now 3/25 for closer 6-month follow-up. We will address his care gaps and his new concerns, follow-up routine labs and make further recommendations at that time.    Review of Systems   Constitutional:  Positive for unexpected weight loss. Negative for appetite change, fatigue and fever.   HENT:  Negative for congestion and ear pain.    Eyes:  Negative for blurred vision.   Respiratory:  Negative for cough, chest tightness, shortness of breath and wheezing.    Cardiovascular:  Negative for chest pain, palpitations and leg swelling.   Gastrointestinal:  Negative for abdominal pain.   Genitourinary:  Negative for difficulty urinating, dysuria, hematuria and urinary incontinence.   Musculoskeletal:  Positive for back pain. Negative for arthralgias and gait problem.   Skin:  Negative for skin lesions.   Neurological:  Negative for syncope, weakness, memory problem and confusion.   Psychiatric/Behavioral:  Negative for self-injury and depressed mood.        Objective   Vital Signs:   /97   Pulse 107   Ht 170.2 cm (67\")   Wt 60.6 kg (133 lb 9.6 oz)   SpO2 97%   BMI 20.92 kg/m²           Physical Exam  Vitals and nursing note reviewed.   Constitutional:       General: He is not in acute distress.     Appearance: Normal appearance. He is not toxic-appearing.   HENT:      Head: Atraumatic.      Right Ear: External ear normal.      Left Ear: External ear normal.      Nose: Nose normal.      Mouth/Throat:      Mouth: Mucous membranes are moist.   Eyes:      General:         Right eye: No discharge.         Left eye: No discharge.      Extraocular Movements: Extraocular movements " intact.      Pupils: Pupils are equal, round, and reactive to light.   Neck:      Comments: No carotid bruits.  Cardiovascular:      Rate and Rhythm: Normal rate and regular rhythm.      Pulses: Normal pulses.      Heart sounds: Normal heart sounds. No murmur heard.     No gallop.      Comments: Heart tones normal, no ectopy.  Pulmonary:      Effort: Pulmonary effort is normal. No respiratory distress.      Breath sounds: No wheezing, rhonchi or rales.      Comments: Lung fields clear bilaterally.  Abdominal:      General: There is no distension.      Palpations: Abdomen is soft. There is no mass.      Tenderness: There is no abdominal tenderness. There is no guarding.   Musculoskeletal:         General: No swelling or tenderness.      Cervical back: No tenderness.      Right lower leg: No edema.      Left lower leg: No edema.      Comments: No peripheral edema.   Skin:     General: Skin is warm and dry.      Findings: No rash.   Neurological:      General: No focal deficit present.      Mental Status: He is alert and oriented to person, place, and time. Mental status is at baseline.      Motor: No weakness.      Gait: Gait normal.   Psychiatric:         Mood and Affect: Mood normal.         Thought Content: Thought content normal.          Result Review   The following data was reviewed by: Franco Decker MD on           Assessment and Plan   Diagnoses and all orders for this visit:    1. Essential hypertension (Primary)  Overview:  Negative family history for this in his immediate family at least.    Assessment & Plan:  Patient being seen a little early for this since he had elevated readings at other providers offices.  He is being evaluated by orthodontist, needs some oral surgery it sounds like, and blood pressure is remaining elevated.    He is up again here in the office, particularly diastolic, he is generally on the tachycardic side of things, pulse typically around 100, so we will go ahead and use low-dose  beta-blocker and titrate as indicated.      2. Asymptomatic microscopic hematuria  Overview:  CT abdomen 1/25:  -2 right-sided nephroliths are seen measuring up to 0.4 cm. No obstructive uropathy or definite ureteral stone.      -Bladder was decompressed without obvious lesion.     -Several bilateral renal lesions appear to reflect cysts. No suspicious lesion.     I reviewed urology note 12/24:  Asymptomatic microscopic hematuria (Primary)  -     Bladder Scan  -     CT Abdomen Pelvis With & Without Contrast; Future  -     Cystoscopy; Future     Will proceed with asymptomatic microhematuria workup per AUA guidelines including upper and lower urinary tract evaluations.  Obtain CT scan with/without/and delayed imaging for upper tract evaluation; patient denies contrast allergy  Patient to schedule cystoscopy for lower urinary tract evaluation after CT scan.       Other orders  -     metoprolol succinate XL (Toprol XL) 25 MG 24 hr tablet; Take 1 tablet by mouth Daily.  Dispense: 90 tablet; Refill: 1        BMI is within normal parameters. No other follow-up for BMI required.    Total Time Spent:  minutes     This time includes time spent by me in the following activities: preparing for the visit, reviewing extensive past medical history and tests, performing a medically appropriate examination and/or evaluation, counseling and educating the patient and/or caregivers, ordering medications, tests, or procedures, referring and/or communicating with other health care professionals and documenting information in the medical record all on this date of service.     Follow Up   Return in about 3 months (around 6/24/2025).  Patient was given instructions and counseling regarding his condition or for health maintenance advice. Please see specific information pulled into the AVS if appropriate.

## 2025-04-11 ENCOUNTER — TELEPHONE (OUTPATIENT)
Age: 40
End: 2025-04-11

## 2025-04-11 NOTE — TELEPHONE ENCOUNTER
Caller: Miah Reardon    Relationship: Self    Best call back number: [phone]     What is the best time to reach you: ANYTIME     Who are you requesting to speak with (clinical staff, provider,  specific staff member): CLINICAL       What was the call regarding: PATIENT IS CALLING TO GIVE BLOOD PRESSURE READINGS, WAS TRACK PRIOR TO PROCEDURE        BLOOD PRESSURE  HR    25-Mar 116/73, 109/70, 112/84  84, 87, 84   26-Mar 113/71, 108/69, 103/74, 112/71, 118/76, 116/76, 117/77, 106/72 , 114/73 82, 80, 80 83, 86, 84, 76 , 71, 71    27-Mar 114/71, 111/73, 104/71, 116/76, 117/73, 115/71  78, 77, 80, 73 , 69 , 70    28-Mar 116/74 , 107/74 , 108/70 , 115/79, 117/ 76, 108/75  77, 73, 73, 86 , 84 , 87    29-Mar 115/80, 110/75, 110/74, 116/84, 109/76 , 124/77  77, 77 , 77 , 71 78 , 71    30-Mar 113/74,  106/73, 109/71 , 116/74 115/75,  86, 87 , 82 78, 75    31-Mar 109/79, 108/72, 105/69, 114/75, 112/77, 113/79,  86, 86, 83, 69, 71, 73    1-Apr 120/74, 110/70, 112/69  83, 84, 84    2-Apr 110/85 , 114/76 84, 81    3-Apr 120/72, 105/69, 108/66, 107/69, 108/66, 112/73  93, 96, 98 , 94, 97, 98    4-Apr 106/70, 103/68, 104/67  95, 93, 92    5-Apr 103/71, 110/72  76, 80    6-Apr 116/73, 106/70, 107/74, 115/74, 114/74,  91, 88, 88, 76, 75    7-Apr 109/70, 101/73  86, 94    8-Apr 123/75, 106/83, 109/77, 124/82  76, 89 ,    9-Apr 106/83, 109/77 85, 89    11-Apr 124/82 89

## 2025-04-14 NOTE — TELEPHONE ENCOUNTER
Blood pressure and pulse are excellent, let patient know he is certainly fine to continue with Toprol-XL 25 mg daily.

## 2025-04-16 ENCOUNTER — TELEPHONE (OUTPATIENT)
Age: 40
End: 2025-04-16

## 2025-04-16 NOTE — TELEPHONE ENCOUNTER
Caller: Miah Reardon    Relationship: Self    Best call back number: [phone]    What form or medical record are you requesting: LETTER STATING THAT HIS BLOOD PRESSURE IS GOOD TO HAVE ORAL SURGERY     Who is requesting this form or medical record from you: PATIENT AND DENTIST     How would you like to receive the form or medical records (pick-up, mail, fax): SEND THROUGH MY CHART     Timeframe paperwork needed: AS SOON AS POSSIBLE.     Additional notes:

## 2025-04-17 ENCOUNTER — TELEPHONE (OUTPATIENT)
Age: 40
End: 2025-04-17
Payer: COMMERCIAL

## 2025-04-17 NOTE — TELEPHONE ENCOUNTER
PT has been notified about providers note and verbalized understanding. PT said he will contact us if the oral surgeon needs a written statement from Dr. Decker.

## 2025-04-17 NOTE — TELEPHONE ENCOUNTER
Yes, unless blood pressure is 180/110 or higher, patient is stable to proceed with planned oral surgery.

## 2025-04-17 NOTE — TELEPHONE ENCOUNTER
PT is requesting a written statement stating that he is cleared for oral surgery as long as his bp isn't 180/110 or higher.     PT requests that it be emailed to him if possible at:     [email or web address]

## 2025-04-17 NOTE — TELEPHONE ENCOUNTER
Please see previous message for this and please send written statement, probably just on a prescription pad, to his oral surgeon, thanks.

## 2025-04-21 NOTE — TELEPHONE ENCOUNTER
I have left a detailed message for pt that I need a fax number or that he can pick this up from our office.